# Patient Record
Sex: FEMALE | Race: WHITE | NOT HISPANIC OR LATINO | Employment: OTHER | URBAN - METROPOLITAN AREA
[De-identification: names, ages, dates, MRNs, and addresses within clinical notes are randomized per-mention and may not be internally consistent; named-entity substitution may affect disease eponyms.]

---

## 2022-05-02 ENCOUNTER — TELEPHONE (OUTPATIENT)
Dept: PODIATRY | Facility: CLINIC | Age: 77
End: 2022-05-02
Payer: COMMERCIAL

## 2022-05-02 ENCOUNTER — OFFICE VISIT (OUTPATIENT)
Dept: PODIATRY | Facility: CLINIC | Age: 77
End: 2022-05-02
Payer: COMMERCIAL

## 2022-05-02 VITALS
SYSTOLIC BLOOD PRESSURE: 145 MMHG | WEIGHT: 128 LBS | HEIGHT: 59 IN | BODY MASS INDEX: 25.8 KG/M2 | DIASTOLIC BLOOD PRESSURE: 70 MMHG | TEMPERATURE: 98 F | HEART RATE: 80 BPM

## 2022-05-02 DIAGNOSIS — E11.9 TYPE 2 DIABETES MELLITUS WITHOUT COMPLICATION, WITHOUT LONG-TERM CURRENT USE OF INSULIN: Primary | ICD-10-CM

## 2022-05-02 DIAGNOSIS — M79.671 PAIN IN BOTH FEET: ICD-10-CM

## 2022-05-02 DIAGNOSIS — M19.079 OSTEOARTHRITIS OF ANKLE AND FOOT, UNSPECIFIED LATERALITY: ICD-10-CM

## 2022-05-02 DIAGNOSIS — E11.9 COMPREHENSIVE DIABETIC FOOT EXAMINATION, TYPE 2 DM, ENCOUNTER FOR: ICD-10-CM

## 2022-05-02 DIAGNOSIS — M79.672 PAIN IN BOTH FEET: ICD-10-CM

## 2022-05-02 DIAGNOSIS — R11.0 NAUSEA: ICD-10-CM

## 2022-05-02 PROCEDURE — 1159F PR MEDICATION LIST DOCUMENTED IN MEDICAL RECORD: ICD-10-PCS | Mod: CPTII,S$GLB,, | Performed by: PODIATRIST

## 2022-05-02 PROCEDURE — 1160F PR REVIEW ALL MEDS BY PRESCRIBER/CLIN PHARMACIST DOCUMENTED: ICD-10-PCS | Mod: CPTII,S$GLB,, | Performed by: PODIATRIST

## 2022-05-02 PROCEDURE — 1125F AMNT PAIN NOTED PAIN PRSNT: CPT | Mod: CPTII,S$GLB,, | Performed by: PODIATRIST

## 2022-05-02 PROCEDURE — 99204 OFFICE O/P NEW MOD 45 MIN: CPT | Mod: S$GLB,,, | Performed by: PODIATRIST

## 2022-05-02 PROCEDURE — 3078F PR MOST RECENT DIASTOLIC BLOOD PRESSURE < 80 MM HG: ICD-10-PCS | Mod: CPTII,S$GLB,, | Performed by: PODIATRIST

## 2022-05-02 PROCEDURE — 99999 PR PBB SHADOW E&M-NEW PATIENT-LVL IV: ICD-10-PCS | Mod: PBBFAC,,, | Performed by: PODIATRIST

## 2022-05-02 PROCEDURE — 3077F PR MOST RECENT SYSTOLIC BLOOD PRESSURE >= 140 MM HG: ICD-10-PCS | Mod: CPTII,S$GLB,, | Performed by: PODIATRIST

## 2022-05-02 PROCEDURE — 1125F PR PAIN SEVERITY QUANTIFIED, PAIN PRESENT: ICD-10-PCS | Mod: CPTII,S$GLB,, | Performed by: PODIATRIST

## 2022-05-02 PROCEDURE — 3078F DIAST BP <80 MM HG: CPT | Mod: CPTII,S$GLB,, | Performed by: PODIATRIST

## 2022-05-02 PROCEDURE — 3077F SYST BP >= 140 MM HG: CPT | Mod: CPTII,S$GLB,, | Performed by: PODIATRIST

## 2022-05-02 PROCEDURE — 1159F MED LIST DOCD IN RCRD: CPT | Mod: CPTII,S$GLB,, | Performed by: PODIATRIST

## 2022-05-02 PROCEDURE — 99999 PR PBB SHADOW E&M-NEW PATIENT-LVL IV: CPT | Mod: PBBFAC,,, | Performed by: PODIATRIST

## 2022-05-02 PROCEDURE — 99204 PR OFFICE/OUTPT VISIT, NEW, LEVL IV, 45-59 MIN: ICD-10-PCS | Mod: S$GLB,,, | Performed by: PODIATRIST

## 2022-05-02 PROCEDURE — 1160F RVW MEDS BY RX/DR IN RCRD: CPT | Mod: CPTII,S$GLB,, | Performed by: PODIATRIST

## 2022-05-02 RX ORDER — GABAPENTIN 800 MG/1
800 TABLET ORAL
COMMUNITY

## 2022-05-02 RX ORDER — FENTANYL 25 UG/1
1 PATCH TRANSDERMAL
COMMUNITY

## 2022-05-02 RX ORDER — OXYCODONE AND ACETAMINOPHEN 5; 325 MG/1; MG/1
1 TABLET ORAL EVERY 4 HOURS PRN
COMMUNITY

## 2022-05-02 RX ORDER — FLUTICASONE PROPIONATE 50 MCG
1 SPRAY, SUSPENSION (ML) NASAL
COMMUNITY

## 2022-05-02 RX ORDER — ALCOHOL 2.38 KG/3.79L
1 GEL TOPICAL 2 TIMES DAILY
Qty: 60 CAPSULE | Refills: 11 | Status: SHIPPED | OUTPATIENT
Start: 2022-05-02 | End: 2022-05-16 | Stop reason: SDUPTHER

## 2022-05-02 RX ORDER — LISINOPRIL 30 MG/1
30 TABLET ORAL
COMMUNITY
Start: 2022-01-03

## 2022-05-02 RX ORDER — PROMETHAZINE HYDROCHLORIDE 12.5 MG/1
12.5 TABLET ORAL
COMMUNITY
Start: 2021-11-30 | End: 2022-05-02 | Stop reason: SDUPTHER

## 2022-05-02 RX ORDER — DICLOFENAC SODIUM 10 MG/G
2 GEL TOPICAL
COMMUNITY
Start: 2021-11-23

## 2022-05-02 RX ORDER — GLUCOSAMINE/CHONDR SU A SOD 750-600 MG
1 TABLET ORAL
COMMUNITY

## 2022-05-02 RX ORDER — METHOCARBAMOL 750 MG/1
750 TABLET, FILM COATED ORAL
COMMUNITY

## 2022-05-02 RX ORDER — PROMETHAZINE HYDROCHLORIDE 12.5 MG/1
12.5 TABLET ORAL 3 TIMES DAILY
Qty: 90 TABLET | Refills: 1 | Status: SHIPPED | OUTPATIENT
Start: 2022-05-02 | End: 2022-05-07 | Stop reason: SDUPTHER

## 2022-05-02 RX ORDER — MONTELUKAST SODIUM 10 MG/1
TABLET ORAL
COMMUNITY
Start: 2021-10-21

## 2022-05-02 RX ORDER — BUTALBITAL, ACETAMINOPHEN AND CAFFEINE 300; 40; 50 MG/1; MG/1; MG/1
1 CAPSULE ORAL
COMMUNITY

## 2022-05-02 NOTE — TELEPHONE ENCOUNTER
Patient requested to have Metanx and phenergan sent to [facility]. She also requests that we make order for Metanx two tablets a day. Please advise

## 2022-05-03 PROBLEM — M19.079 OSTEOARTHRITIS OF ANKLE AND FOOT: Status: ACTIVE | Noted: 2022-05-03

## 2022-05-03 PROBLEM — M79.671 PAIN IN BOTH FEET: Status: ACTIVE | Noted: 2022-05-03

## 2022-05-03 PROBLEM — M79.672 PAIN IN BOTH FEET: Status: ACTIVE | Noted: 2022-05-03

## 2022-05-03 PROBLEM — E11.9 COMPREHENSIVE DIABETIC FOOT EXAMINATION, TYPE 2 DM, ENCOUNTER FOR: Status: ACTIVE | Noted: 2022-05-03

## 2022-05-03 NOTE — PROGRESS NOTES
Subjective:       Patient ID: Silvia Quinteros is a 77 y.o. female.    Chief Complaint: Foot Pain, Diabetes Mellitus, and Foot Problem  Patient presents today for a comprehensive new patient diabetic evaluation she also relates bilateral foot pain and decreased sensation in both feet.  Patient states she has a history of seizures she was placed on phenobarbital she states she believes that when she was placed on this it affected her joints all over her body.  Patient states she has had multiple surgeries on the left foot to remove bone spurs and a ganglionic cyst.  Patient states she believes she has a cyst now on the top of her right foot.    Past Medical History:   Diagnosis Date    Bladder adhesions      Past Surgical History:   Procedure Laterality Date    FOOT GANGLION EXCISION      HYSTERECTOMY      KNEE ARTHROPLASTY      SHOULDER ARTHROSCOPY      TUBAL LIGATION       Family History   Problem Relation Age of Onset    Cancer Mother     Heart disease Father     Cancer Sister     Pancreatic cancer Sister      Social History     Socioeconomic History    Marital status:    Tobacco Use    Smoking status: Former Smoker     Years: 30.00    Smokeless tobacco: Never Used   Social History Narrative    ** Merged History Encounter **            Current Outpatient Medications   Medication Sig Dispense Refill    butalbital-acetaminophen-caff -40 mg Cap Take 1 each by mouth.      diclofenac sodium (VOLTAREN) 1 % Gel 2 g.      fentaNYL (DURAGESIC) 25 mcg/hr Place 1 patch onto the skin.      fluticasone propionate (FLONASE) 50 mcg/actuation nasal spray 1 spray by Nasal route.      gabapentin (NEURONTIN) 800 MG tablet Take 800 mg by mouth.      lisinopriL (PRINIVIL,ZESTRIL) 30 MG tablet 30 mg.      lutein-zeaxanthin 25-5 mg Cap Take 1 each by mouth.      methocarbamoL (ROBAXIN) 750 MG Tab Take 750 mg by mouth.      montelukast (SINGULAIR) 10 mg tablet   = 1 tab, Oral, qPM, # 90 tab, 0 Refill(s),  "Maintenance, Pharmacy: [id number], 150, cm, 10/21/21 13:55:00 CDT, Height/Length Measured, 62.3, kg, 10/21/21 13:55:00 CDT, Weight Dosing      oxyCODONE-acetaminophen (PERCOCET) 5-325 mg per tablet Take 1 tablet by mouth every 4 (four) hours as needed.      elmppqqap-B4-elS94-algal oil (METANX/FOLTANX RF) 3 mg-35 mg-2 mg -90.314 mg Cap Take 1 capsule by mouth 2 (two) times a day. 60 capsule 11    promethazine (PHENERGAN) 12.5 MG Tab Take 1 tablet (12.5 mg total) by mouth 3 (three) times daily. 90 tablet 1     No current facility-administered medications for this visit.     Review of patient's allergies indicates:   Allergen Reactions    Penicillins Other (See Comments)     RUNS IN THE FAMILY      Cefuroxime Hives    Iohexol Other (See Comments)     headaches    Ondansetron hcl (pf) Nausea And Vomiting       Review of Systems   Musculoskeletal: Positive for arthralgias, gait problem and joint swelling.   Skin: Positive for color change.   All other systems reviewed and are negative.      Objective:      Vitals:    05/02/22 1404   BP: (!) 145/70   Pulse: 80   Temp: 97.6 °F (36.4 °C)   Weight: 58.1 kg (128 lb)   Height: 4' 11" (1.499 m)     Physical Exam  Vitals and nursing note reviewed. Exam conducted with a chaperone present.   Constitutional:       Appearance: Normal appearance.   Cardiovascular:      Pulses:           Dorsalis pedis pulses are 1+ on the right side and 1+ on the left side.        Posterior tibial pulses are 1+ on the right side and 1+ on the left side.   Pulmonary:      Effort: Pulmonary effort is normal.   Musculoskeletal:         General: Swelling, tenderness and deformity present.      Right foot: Deformity and prominent metatarsal heads present.      Left foot: Prominent metatarsal heads present.        Feet:    Feet:      Right foot:      Protective Sensation: 4 sites tested. 2 sites sensed.      Skin integrity: Erythema, warmth and dry skin present.      Left foot:     "  Protective Sensation: 4 sites tested. 2 sites sensed.      Skin integrity: Dry skin present.   Skin:     Capillary Refill: Capillary refill takes 2 to 3 seconds.      Findings: Erythema present.   Neurological:      Mental Status: She is alert.      Sensory: Sensory deficit present.   Psychiatric:         Mood and Affect: Mood normal.         Behavior: Behavior normal.         Thought Content: Thought content normal.                              Assessment:       1. Type 2 diabetes mellitus without complication, without long-term current use of insulin    2. Comprehensive diabetic foot examination, type 2 DM, encounter for    3. Osteoarthritis of ankle and foot, unspecified laterality    4. Nausea    5. Pain in both feet        Plan:       Patient presents today for a comprehensive new patient diabetic evaluation she also relates bilateral foot pain and decreased sensation in both feet.  Patient states she has a history of seizures she was placed on phenobarbital she states she believes that when she was placed on this it affected her joints all over her body.  Patient states she has had multiple surgeries on the left foot to remove bone spurs and a ganglionic cyst.  Patient states she believes she has a cyst now on the top of her right foot.  Patient states she has also been experiencing nausea she states she does experience this on a regular basis she has requested a prescription for Phenergan which helps her significantly.  On evaluation patient has no skin breaks no active signs of infection she does have however degenerative changes appreciated bilateral with significant spurring noted on the dorsal aspect of the patient's right midfoot there appears to be in overlying cyst in this region.  Patient does have significantly elevated arches both weight-bearing and nonweightbearing bilateral patient displays signs of diabetic related neuropathy with increased loss of sensation noted on the left side in comparison  to the right.  I did discuss at length and in detail the patient's joint issues degenerative arthritis how this can affect her feet I have advised her I would recommend we proceed conservatively she has very high arches she is wearing good [place] running shoes however they are worn out and the need to be replaced I did remove the insoles and gave the patient some diabetic insoles with additional blue arch padding advising her supporting the arch area will take pressure off of the midfoot and the plantar aspect of both feet and this should make her much more comfortable.  I also discussed scar care pads for the patient which is a gel layer that she can place over the top of the right foot to take pressure off of the area of the spurring this is a recommendation of something that she can purchase online or at a local pharmacy.  Patient indicates she has previously gotten Metanx for her diabetic related neuropathy she does thinks that this helps reducing the symptoms of her neuropathy however she has only been taking it 1 time a day she states there has been occasions where this has made her nauseous and bothered her stomach I have advised her if she can tolerate taking this after meals she really needs to take it twice a day for the best fact I did give her a new prescription for the med next as well as the Phenergan.  I plan to follow up with the patient 3 weeks I want see how she responds to conservative treatment in the supports dispensed today we can always discuss surgical intervention as necessary however I do believe that she will get relief with conservative treatment re-evaluation of be 3 weeks comprehensive diabetic evaluation performed patient advised to contact us if she has any problems questions or concerns prior to scheduled follow-up.  Patient was advised if she does get new running shoes to be sure follow-up appointment we can always make adjustments to these as necessary.  This note was created using  M*Modal voice recognition software that occasionally misinterpreted phrases or words.

## 2022-05-05 ENCOUNTER — TELEPHONE (OUTPATIENT)
Dept: PODIATRY | Facility: CLINIC | Age: 77
End: 2022-05-05
Payer: COMMERCIAL

## 2022-05-05 NOTE — TELEPHONE ENCOUNTER
----- Message from Magaly Kunz, Patient Care Assistant sent at 5/5/2022  4:48 PM CDT -----  Contact: Pt  Type: Needs Medical Advice    Who Called: Pt  Best Call Back Number: [phone]  [facility]  Phone: [phone]  Fax: [phone]  Inquiry/Question: Pt is calling to get the status of her phenergan. Please call back and advise. Thank you~          
----- Message from Magaly Kunz, Patient Care Assistant sent at 5/5/2022  4:48 PM CDT -----  Contact: Pt  Type: Needs Medical Advice    Who Called: Pt  Best Call Back Number: [phone]  [facility]  Phone: [phone]  Fax: [phone]  Inquiry/Question: Pt is calling to get the status of her phenergan. Please call back and advise. Thank you~          
LVM for patient medication was sent to pharmacy on the day of her office visit  
LVM that prescription had been sent to pharmacy  
no cyanosis/no pedal edema/no clubbing

## 2022-05-07 RX ORDER — PROMETHAZINE HYDROCHLORIDE 12.5 MG/1
12.5 TABLET ORAL 3 TIMES DAILY
Qty: 90 TABLET | Refills: 1 | Status: SHIPPED | OUTPATIENT
Start: 2022-05-07 | End: 2022-06-06

## 2022-05-16 ENCOUNTER — TELEPHONE (OUTPATIENT)
Dept: PODIATRY | Facility: CLINIC | Age: 77
End: 2022-05-16
Payer: COMMERCIAL

## 2022-05-16 RX ORDER — ALCOHOL 2.38 KG/3.79L
1 GEL TOPICAL 2 TIMES DAILY
Qty: 180 CAPSULE | Refills: 3 | Status: SHIPPED | OUTPATIENT
Start: 2022-05-16 | End: 2022-08-14

## 2022-05-16 NOTE — TELEPHONE ENCOUNTER
----- Message from Daphniekrystyna Martinez sent at 5/16/2022  2:21 PM CDT -----  Contact: pt  Type:  RX Refill Request    Who Called:  pt  Refill or New Rx:  refill  RX Name and Strength:  metanx   How is the patient currently taking it? (ex. 1XDay):  2x daily   Is this a 30 day or 90 day RX:  90 day   Preferred Pharmacy with phone number:    [facility] [place], [place] - [address]  [address]  [facility] [address]  [facility] [address]  Phone: [phone] Fax: [phone]  Local or Mail Order:  Local  Ordering Provider:  Lamine Sparks Call Back Number:  [phone]  Additional Information:

## 2022-05-16 NOTE — TELEPHONE ENCOUNTER
Patient didn't  last prescription at [facility] due to cost and would like a new prescription sent to [facility] pharmacy. Please advise.

## 2022-05-23 ENCOUNTER — OFFICE VISIT (OUTPATIENT)
Dept: PODIATRY | Facility: CLINIC | Age: 77
End: 2022-05-23
Payer: COMMERCIAL

## 2022-05-23 VITALS
WEIGHT: 130 LBS | DIASTOLIC BLOOD PRESSURE: 73 MMHG | TEMPERATURE: 98 F | SYSTOLIC BLOOD PRESSURE: 172 MMHG | HEIGHT: 59 IN | HEART RATE: 71 BPM | BODY MASS INDEX: 26.21 KG/M2

## 2022-05-23 DIAGNOSIS — E11.9 TYPE 2 DIABETES MELLITUS WITHOUT COMPLICATION, WITHOUT LONG-TERM CURRENT USE OF INSULIN: ICD-10-CM

## 2022-05-23 DIAGNOSIS — M19.079 OSTEOARTHRITIS OF ANKLE AND FOOT, UNSPECIFIED LATERALITY: Primary | ICD-10-CM

## 2022-05-23 DIAGNOSIS — E11.9 COMPREHENSIVE DIABETIC FOOT EXAMINATION, TYPE 2 DM, ENCOUNTER FOR: ICD-10-CM

## 2022-05-23 PROCEDURE — 99214 PR OFFICE/OUTPT VISIT, EST, LEVL IV, 30-39 MIN: ICD-10-PCS | Mod: S$GLB,,, | Performed by: PODIATRIST

## 2022-05-23 PROCEDURE — 99999 PR PBB SHADOW E&M-EST. PATIENT-LVL IV: ICD-10-PCS | Mod: PBBFAC,,, | Performed by: PODIATRIST

## 2022-05-23 PROCEDURE — 1159F MED LIST DOCD IN RCRD: CPT | Mod: CPTII,S$GLB,, | Performed by: PODIATRIST

## 2022-05-23 PROCEDURE — 1160F PR REVIEW ALL MEDS BY PRESCRIBER/CLIN PHARMACIST DOCUMENTED: ICD-10-PCS | Mod: CPTII,S$GLB,, | Performed by: PODIATRIST

## 2022-05-23 PROCEDURE — 1159F PR MEDICATION LIST DOCUMENTED IN MEDICAL RECORD: ICD-10-PCS | Mod: CPTII,S$GLB,, | Performed by: PODIATRIST

## 2022-05-23 PROCEDURE — 1160F RVW MEDS BY RX/DR IN RCRD: CPT | Mod: CPTII,S$GLB,, | Performed by: PODIATRIST

## 2022-05-23 PROCEDURE — 1125F PR PAIN SEVERITY QUANTIFIED, PAIN PRESENT: ICD-10-PCS | Mod: CPTII,S$GLB,, | Performed by: PODIATRIST

## 2022-05-23 PROCEDURE — 99999 PR PBB SHADOW E&M-EST. PATIENT-LVL IV: CPT | Mod: PBBFAC,,, | Performed by: PODIATRIST

## 2022-05-23 PROCEDURE — 3078F PR MOST RECENT DIASTOLIC BLOOD PRESSURE < 80 MM HG: ICD-10-PCS | Mod: CPTII,S$GLB,, | Performed by: PODIATRIST

## 2022-05-23 PROCEDURE — 99214 OFFICE O/P EST MOD 30 MIN: CPT | Mod: S$GLB,,, | Performed by: PODIATRIST

## 2022-05-23 PROCEDURE — 3078F DIAST BP <80 MM HG: CPT | Mod: CPTII,S$GLB,, | Performed by: PODIATRIST

## 2022-05-23 PROCEDURE — 3077F SYST BP >= 140 MM HG: CPT | Mod: CPTII,S$GLB,, | Performed by: PODIATRIST

## 2022-05-23 PROCEDURE — 3077F PR MOST RECENT SYSTOLIC BLOOD PRESSURE >= 140 MM HG: ICD-10-PCS | Mod: CPTII,S$GLB,, | Performed by: PODIATRIST

## 2022-05-23 PROCEDURE — 1125F AMNT PAIN NOTED PAIN PRSNT: CPT | Mod: CPTII,S$GLB,, | Performed by: PODIATRIST

## 2022-05-24 NOTE — PROGRESS NOTES
Subjective:       Patient ID: Silvia Quinteros is a 77 y.o. female.    Chief Complaint: Follow-up, Foot Pain, Foot Problem, and Diabetes Mellitus  Patient presents today for a follow-up patient diabetic evaluation she also relates bilateral foot pain and decreased sensation in both feet.  Patient states she has a history of seizures she was placed on phenobarbital she states she believes that when she was placed on this it affected her joints all over her body.  Patient states she has had multiple surgeries on the left foot to remove bone spurs and a ganglionic cyst.  Patient states she believes she has a cyst now on the top of her right foot.    Past Medical History:   Diagnosis Date    Bladder adhesions      Past Surgical History:   Procedure Laterality Date    FOOT GANGLION EXCISION      HYSTERECTOMY      KNEE ARTHROPLASTY      SHOULDER ARTHROSCOPY      TUBAL LIGATION       Family History   Problem Relation Age of Onset    Cancer Mother     Heart disease Father     Cancer Sister     Pancreatic cancer Sister      Social History     Socioeconomic History    Marital status:    Tobacco Use    Smoking status: Former Smoker     Years: 30.00    Smokeless tobacco: Never Used   Social History Narrative    ** Merged History Encounter **            Current Outpatient Medications   Medication Sig Dispense Refill    butalbital-acetaminophen-caff -40 mg Cap Take 1 each by mouth.      diclofenac sodium (VOLTAREN) 1 % Gel 2 g.      fentaNYL (DURAGESIC) 25 mcg/hr Place 1 patch onto the skin.      fluticasone propionate (FLONASE) 50 mcg/actuation nasal spray 1 spray by Nasal route.      gabapentin (NEURONTIN) 800 MG tablet Take 800 mg by mouth.      rvemnqnvn-Y2-zwN82-algal oil (METANX/FOLTANX RF) 3 mg-35 mg-2 mg -90.314 mg Cap Take 1 capsule by mouth 2 (two) times a day. 180 capsule 3    lisinopriL (PRINIVIL,ZESTRIL) 30 MG tablet 30 mg.      lutein-zeaxanthin 25-5 mg Cap Take 1 each by mouth.       "methocarbamoL (ROBAXIN) 750 MG Tab Take 750 mg by mouth.      montelukast (SINGULAIR) 10 mg tablet   = 1 tab, Oral, qPM, # 90 tab, 0 Refill(s), Maintenance, Pharmacy: [facility] #[id number], 150, cm, 10/21/21 13:55:00 CDT, Height/Length Measured, 62.3, kg, 10/21/21 13:55:00 CDT, Weight Dosing      oxyCODONE-acetaminophen (PERCOCET) 5-325 mg per tablet Take 1 tablet by mouth every 4 (four) hours as needed.      promethazine (PHENERGAN) 12.5 MG Tab Take 1 tablet (12.5 mg total) by mouth 3 (three) times daily. 90 tablet 1     No current facility-administered medications for this visit.     Review of patient's allergies indicates:   Allergen Reactions    Penicillins Other (See Comments)     RUNS IN THE FAMILY      Cefuroxime Hives    Iohexol Other (See Comments)     headaches    Ondansetron hcl (pf) Nausea And Vomiting       Review of Systems   Musculoskeletal: Positive for arthralgias, gait problem and joint swelling.   Skin: Positive for color change.   All other systems reviewed and are negative.      Objective:      Vitals:    05/23/22 1423   BP: (!) 172/73   Pulse: 71   Temp: 97.8 °F (36.6 °C)   Weight: 59 kg (130 lb)   Height: 4' 11" (1.499 m)     Physical Exam  Vitals and nursing note reviewed. Exam conducted with a chaperone present.   Constitutional:       Appearance: Normal appearance.   Cardiovascular:      Pulses:           Dorsalis pedis pulses are 1+ on the right side and 1+ on the left side.        Posterior tibial pulses are 1+ on the right side and 1+ on the left side.   Pulmonary:      Effort: Pulmonary effort is normal.   Musculoskeletal:         General: Swelling, tenderness and deformity present.      Right foot: Deformity and prominent metatarsal heads present.      Left foot: Prominent metatarsal heads present.        Feet:    Feet:      Right foot:      Protective Sensation: 4 sites tested. 2 sites sensed.      Skin integrity: Erythema, warmth and dry skin present.      Left foot:     "  Protective Sensation: 4 sites tested. 2 sites sensed.      Skin integrity: Dry skin present.   Skin:     Capillary Refill: Capillary refill takes 2 to 3 seconds.      Findings: Erythema present.   Neurological:      Mental Status: She is alert.      Sensory: Sensory deficit present.   Psychiatric:         Mood and Affect: Mood normal.         Behavior: Behavior normal.         Thought Content: Thought content normal.                                                          Assessment:       1. Osteoarthritis of ankle and foot, unspecified laterality    2. Type 2 diabetes mellitus without complication, without long-term current use of insulin    3. Comprehensive diabetic foot examination, type 2 DM, encounter for        Plan:       Patient presents today for a follow-up patient diabetic evaluation she also relates bilateral foot pain and decreased sensation in both feet.  Patient has brought some x-rays with her on a CD that were performed approximately 2 months ago.  I did discuss the x-rays at length and in detail with the patient she does have some a bony cystic changes in the midfoot right in comparison to the left possibly because she still has extensive spurring on the dorsal right foot where the left foot as previously been surgically addressed.  I did advised the patient it is my opinion at this point that she does not need an MRI of the right foot likely there is a ganglionic cyst this is benign she certainly does not need an MRI with contrast.  Patient is currently taking her Metanx she states she has also been taking Celebrex which has helped considerably.  Patient states that the diabetic insoles felt very good however the extra blue arch padding was just too much in she ultimately had to remove them after several days.  Following removal of the blue arch padding I put a much smaller adhesive felt pad in the plantar arch of insoles I do feel that the patient will be able to tolerate these and this should  further address her discomfort.  At this point I am not recommending surgical intervention that would be a last resort on the right foot however if her symptoms worsen she is to contact us immediately.  Follow-up as needed.  This note was created using [employer] voice recognition software that occasionally misinterpreted phrases or words.

## 2023-08-01 RX ORDER — L-METHYLFOLATE-ALGAE-VIT B12-B6 CAP 3-90.314-2-35 MG 3-90.314-2-35 MG
1 CAP ORAL 2 TIMES DAILY
Qty: 180 CAPSULE | Refills: 3 | OUTPATIENT
Start: 2023-08-01

## 2023-08-02 NOTE — TELEPHONE ENCOUNTER
LVM for patient that she will need an appointment to get refills as it has been over a year since patient was seen.